# Patient Record
Sex: MALE | Race: WHITE
[De-identification: names, ages, dates, MRNs, and addresses within clinical notes are randomized per-mention and may not be internally consistent; named-entity substitution may affect disease eponyms.]

---

## 2024-03-15 ENCOUNTER — HOSPITAL ENCOUNTER
Dept: HOSPITAL 101 - LAB | Age: 49
Discharge: HOME | End: 2024-03-15
Payer: COMMERCIAL

## 2024-03-15 DIAGNOSIS — Z00.00: Primary | ICD-10-CM

## 2024-03-15 LAB
ADD MANUAL DIFF: NO
ALANINE AMINOTRANSFERASE: 62 U/L (ref 16–63)
ALBUMIN GLOBULIN RATIO: 1.1
ALBUMIN LEVEL: 3.7 G/DL (ref 3.4–5)
ALKALINE PHOSPHATASE: 55 U/L (ref 46–116)
ANION GAP: 11.8
ASPARTATE AMINO TRANSFERASE: 41 U/L (ref 15–37)
BLOOD UREA NITROGEN: 15 MG/DL (ref 7–18)
CALCIUM: 8.6 MG/DL (ref 8.5–10.1)
CARBON DIOXIDE: 29 MMOL/L (ref 21–32)
CHLORIDE: 102 MMOL/L (ref 98–107)
ESTIMATED GFR (AFRICAN AMERICA: >60 (ref 60–?)
ESTIMATED GFR (NON-AFRICAN AME: >60 (ref 60–?)
GLOBULIN: 3.5 G/DL
GLUCOSE: 118 MG/DL (ref 74–106)
HEMATOCRIT: 46.3 % (ref 42–54)
HEMOGLOBIN: 15.6 G/DL (ref 14–18)
IMMATURE GRANULOCYTES ABS AUTO: 0.01 10^3/UL (ref 0–0.03)
IMMATURE GRANULOCYTES PCT AUTO: 0.2 % (ref 0–0.5)
LYMPHOCYTES  ABSOLUTE AUTO: 1.3 10^3/UL (ref 1.2–3.8)
MCV RBC: 94.3 FL (ref 80–94)
MEAN CORPUSCULAR HEMOGLOBIN: 31.8 PG (ref 25.9–34)
MEAN CORPUSCULAR HGB CONC: 33.7 G/DL (ref 29.9–35.2)
PLATELET COUNT: 204 10^3/UL (ref 150–450)
POTASSIUM: 3.8 MMOL/L (ref 3.5–5.1)
RED BLOOD COUNT: 4.91 10^6/UL (ref 4.7–6.1)
SODIUM: 139 MMOL/L (ref 136–145)
TOTAL PROTEIN: 7.2 G/DL (ref 6.4–8.2)
WHITE BLOOD COUNT: 5.5 10^3/UL (ref 4–11)

## 2024-03-15 PROCEDURE — 80061 LIPID PANEL: CPT

## 2024-03-15 PROCEDURE — 80053 COMPREHEN METABOLIC PANEL: CPT

## 2024-03-15 PROCEDURE — 85025 COMPLETE CBC W/AUTO DIFF WBC: CPT

## 2024-03-15 PROCEDURE — 36415 COLL VENOUS BLD VENIPUNCTURE: CPT

## 2024-03-15 PROCEDURE — G0103 PSA SCREENING: HCPCS

## 2024-03-18 LAB
CHOLESTEROL: 123 MG/DL (ref ?–200)
HDL CHOLESTEROL: 38 MG/DL (ref 40–60)
TRIGLYCERIDES: 103 MG/DL (ref ?–150)
VLDL CHOLESTEROL: 20.6 MG/DL

## 2024-11-11 ENCOUNTER — APPOINTMENT (OUTPATIENT)
Dept: URBAN - METROPOLITAN AREA CLINIC 204 | Age: 49
Setting detail: DERMATOLOGY
End: 2024-11-11

## 2024-11-11 DIAGNOSIS — L663 OTHER SPECIFIED DISEASES OF HAIR AND HAIR FOLLICLES: ICD-10-CM

## 2024-11-11 DIAGNOSIS — L71.8 OTHER ROSACEA: ICD-10-CM

## 2024-11-11 DIAGNOSIS — L30.0 NUMMULAR DERMATITIS: ICD-10-CM

## 2024-11-11 DIAGNOSIS — L738 OTHER SPECIFIED DISEASES OF HAIR AND HAIR FOLLICLES: ICD-10-CM

## 2024-11-11 PROBLEM — L02.424 FURUNCLE OF LEFT UPPER LIMB: Status: ACTIVE | Noted: 2024-11-11

## 2024-11-11 PROBLEM — L02.423 FURUNCLE OF RIGHT UPPER LIMB: Status: ACTIVE | Noted: 2024-11-11

## 2024-11-11 PROCEDURE — OTHER PRESCRIPTION MEDICATION MANAGEMENT: OTHER

## 2024-11-11 PROCEDURE — OTHER COUNSELING: OTHER

## 2024-11-11 PROCEDURE — 99204 OFFICE O/P NEW MOD 45 MIN: CPT

## 2024-11-11 PROCEDURE — OTHER PRESCRIPTION: OTHER

## 2024-11-11 PROCEDURE — OTHER MIPS QUALITY: OTHER

## 2024-11-11 RX ORDER — METRONIDAZOLE 7.5 MG/G
CREAM TOPICAL
Qty: 45 | Refills: 3 | Status: ERX | COMMUNITY
Start: 2024-11-11

## 2024-11-11 RX ORDER — TRIAMCINOLONE ACETONIDE 1 MG/G
CREAM TOPICAL TWICE A DAY
Qty: 80 | Refills: 3 | Status: ERX | COMMUNITY
Start: 2024-11-11

## 2024-11-11 RX ORDER — DOXYCYCLINE HYCLATE 100 MG/1
CAPSULE, GELATIN COATED ORAL
Qty: 30 | Refills: 4 | Status: ERX | COMMUNITY
Start: 2024-11-11

## 2024-11-11 ASSESSMENT — LOCATION SIMPLE DESCRIPTION DERM
LOCATION SIMPLE: RIGHT UPPER ARM
LOCATION SIMPLE: LEFT UPPER ARM
LOCATION SIMPLE: NOSE
LOCATION SIMPLE: RIGHT PRETIBIAL REGION

## 2024-11-11 ASSESSMENT — LOCATION ZONE DERM
LOCATION ZONE: ARM
LOCATION ZONE: LEG
LOCATION ZONE: NOSE

## 2024-11-11 ASSESSMENT — LOCATION DETAILED DESCRIPTION DERM
LOCATION DETAILED: NASAL DORSUM
LOCATION DETAILED: RIGHT DISTAL POSTERIOR UPPER ARM
LOCATION DETAILED: RIGHT DISTAL PRETIBIAL REGION
LOCATION DETAILED: LEFT PROXIMAL POSTERIOR UPPER ARM

## 2024-11-11 NOTE — PROCEDURE: PRESCRIPTION MEDICATION MANAGEMENT
Detail Level: Zone
Initiate Treatment: Moisturize with CeraVe mixed with Triamcinolone cream.
Render In Strict Bullet Format?: No
Initiate Treatment: Wash face and apply Metronidazole cream twice daily.\\nDoxycycline 100mg take 1 tablet once a day X 4-6 weeks. Once clear, can take 1 tablet on Monday, Wednesday and Friday for maintenance. If starting to flare up, can increase back to daily use for 4-6 weeks.

## 2024-11-11 NOTE — HPI: RASH
What Type Of Note Output Would You Prefer (Optional)?: Bullet Format
Is The Patient Presenting As Previously Scheduled?: Yes
Is This A New Presentation, Or A Follow-Up?: Rash
Additional History: Pt states that he went to a doctor previously that gave him cortisone but this does not seem to be helping. He states that the rash is just bumpy and not itchy.

## 2025-03-13 ENCOUNTER — APPOINTMENT (OUTPATIENT)
Dept: URBAN - METROPOLITAN AREA CLINIC 204 | Age: 50
Setting detail: DERMATOLOGY
End: 2025-03-13

## 2025-03-13 DIAGNOSIS — L71.8 OTHER ROSACEA: ICD-10-CM

## 2025-03-13 DIAGNOSIS — L30.0 NUMMULAR DERMATITIS: ICD-10-CM

## 2025-03-13 DIAGNOSIS — L73.9 FOLLICULAR DISORDER, UNSPECIFIED: ICD-10-CM

## 2025-03-13 PROCEDURE — OTHER COUNSELING: OTHER

## 2025-03-13 PROCEDURE — OTHER ADDITIONAL NOTES: OTHER

## 2025-03-13 PROCEDURE — OTHER MIPS QUALITY: OTHER

## 2025-03-13 PROCEDURE — OTHER PRESCRIPTION MEDICATION MANAGEMENT: OTHER

## 2025-03-13 PROCEDURE — OTHER PRESCRIPTION: OTHER

## 2025-03-13 PROCEDURE — 99214 OFFICE O/P EST MOD 30 MIN: CPT

## 2025-03-13 RX ORDER — METRONIDAZOLE 7.5 MG/G
CREAM TOPICAL
Qty: 45 | Refills: 3 | Status: ERX

## 2025-03-13 RX ORDER — DOXYCYCLINE HYCLATE 100 MG/1
CAPSULE, GELATIN COATED ORAL
Qty: 30 | Refills: 4 | Status: ERX

## 2025-03-13 RX ORDER — TRIAMCINOLONE ACETONIDE 1 MG/G
CREAM TOPICAL TWICE A DAY
Qty: 80 | Refills: 3 | Status: ERX

## 2025-03-13 ASSESSMENT — LOCATION SIMPLE DESCRIPTION DERM
LOCATION SIMPLE: RIGHT PRETIBIAL REGION
LOCATION SIMPLE: LEFT UPPER ARM
LOCATION SIMPLE: RIGHT UPPER ARM
LOCATION SIMPLE: NOSE

## 2025-03-13 ASSESSMENT — LOCATION DETAILED DESCRIPTION DERM
LOCATION DETAILED: NASAL DORSUM
LOCATION DETAILED: LEFT PROXIMAL POSTERIOR UPPER ARM
LOCATION DETAILED: RIGHT DISTAL POSTERIOR UPPER ARM
LOCATION DETAILED: RIGHT DISTAL PRETIBIAL REGION

## 2025-03-13 ASSESSMENT — BSA RASH: BSA RASH: 0

## 2025-03-13 ASSESSMENT — LOCATION ZONE DERM
LOCATION ZONE: LEG
LOCATION ZONE: NOSE
LOCATION ZONE: ARM

## 2025-03-13 ASSESSMENT — SEVERITY ASSESSMENT OVERALL AMONG ALL PATIENTS
IN YOUR EXPERIENCE, AMONG ALL PATIENTS YOU HAVE SEEN WITH THIS CONDITION, HOW SEVERE IS THIS PATIENT'S CONDITION?: MILD TO MODERATE

## 2025-03-13 ASSESSMENT — ITCH NUMERIC RATING SCALE: HOW SEVERE IS YOUR ITCHING?: 0

## 2025-03-13 ASSESSMENT — SEVERITY ASSESSMENT: SEVERITY: CLEAR

## 2025-03-13 NOTE — PROCEDURE: PRESCRIPTION MEDICATION MANAGEMENT
Detail Level: Zone
Initiate Treatment: Moisturize with CeraVe mixed with Triamcinolone cream.
Render In Strict Bullet Format?: No
Continue Regimen: As above, will continue Doxy Mon, Wed, Fri as directed
Initiate Treatment: Wash face and apply Metronidazole cream twice daily.\\nDoxycycline 100mg take 1 tablet once a day X 4-6 weeks. Once clear, can take 1 tablet on Monday, Wednesday and Friday for maintenance. If starting to flare up, can increase back to daily use for 4-6 weeks.

## 2025-03-13 NOTE — PROCEDURE: ADDITIONAL NOTES
Additional Notes: Continue Triamcinolone, CeraVe bid as needed
Detail Level: Detailed
Render Risk Assessment In Note?: yes